# Patient Record
Sex: MALE | Race: OTHER | Employment: PART TIME | URBAN - METROPOLITAN AREA
[De-identification: names, ages, dates, MRNs, and addresses within clinical notes are randomized per-mention and may not be internally consistent; named-entity substitution may affect disease eponyms.]

---

## 2018-06-29 ENCOUNTER — APPOINTMENT (OUTPATIENT)
Dept: ULTRASOUND IMAGING | Age: 38
End: 2018-06-29
Attending: EMERGENCY MEDICINE
Payer: SELF-PAY

## 2018-06-29 ENCOUNTER — HOSPITAL ENCOUNTER (EMERGENCY)
Age: 38
Discharge: HOME | End: 2018-06-29
Attending: EMERGENCY MEDICINE
Payer: SELF-PAY

## 2018-06-29 VITALS
SYSTOLIC BLOOD PRESSURE: 190 MMHG | OXYGEN SATURATION: 100 % | HEART RATE: 96 BPM | DIASTOLIC BLOOD PRESSURE: 86 MMHG | TEMPERATURE: 97.5 F | RESPIRATION RATE: 18 BRPM

## 2018-06-29 DIAGNOSIS — K80.50 BILIARY COLIC: Primary | ICD-10-CM

## 2018-06-29 LAB
ALBUMIN SERPL-MCNC: 3.9 G/DL (ref 3.5–5)
ALBUMIN/GLOB SERPL: 0.9 {RATIO} (ref 1.1–2.2)
ALP SERPL-CCNC: 81 U/L (ref 45–117)
ALT SERPL-CCNC: 37 U/L (ref 12–78)
ANION GAP SERPL CALC-SCNC: 9 MMOL/L (ref 5–15)
APPEARANCE UR: CLEAR
AST SERPL-CCNC: 19 U/L (ref 15–37)
BACTERIA URNS QL MICRO: NEGATIVE /HPF
BASOPHILS # BLD: 0 K/UL (ref 0–0.1)
BASOPHILS NFR BLD: 0 % (ref 0–1)
BILIRUB SERPL-MCNC: 0.5 MG/DL (ref 0.2–1)
BILIRUB UR QL: NEGATIVE
BUN SERPL-MCNC: 10 MG/DL (ref 6–20)
BUN/CREAT SERPL: 10 (ref 12–20)
CALCIUM SERPL-MCNC: 8.9 MG/DL (ref 8.5–10.1)
CHLORIDE SERPL-SCNC: 102 MMOL/L (ref 97–108)
CO2 SERPL-SCNC: 29 MMOL/L (ref 21–32)
COLOR UR: NORMAL
CREAT SERPL-MCNC: 0.97 MG/DL (ref 0.7–1.3)
DIFFERENTIAL METHOD BLD: ABNORMAL
EOSINOPHIL # BLD: 0 K/UL (ref 0–0.4)
EOSINOPHIL NFR BLD: 0 % (ref 0–7)
EPITH CASTS URNS QL MICRO: NORMAL /LPF
ERYTHROCYTE [DISTWIDTH] IN BLOOD BY AUTOMATED COUNT: 12.3 % (ref 11.5–14.5)
GLOBULIN SER CALC-MCNC: 4.4 G/DL (ref 2–4)
GLUCOSE SERPL-MCNC: 125 MG/DL (ref 65–100)
GLUCOSE UR STRIP.AUTO-MCNC: NEGATIVE MG/DL
HCT VFR BLD AUTO: 41.3 % (ref 36.6–50.3)
HGB BLD-MCNC: 14.1 G/DL (ref 12.1–17)
HGB UR QL STRIP: NEGATIVE
HYALINE CASTS URNS QL MICRO: NORMAL /LPF (ref 0–5)
IMM GRANULOCYTES # BLD: 0 K/UL (ref 0–0.04)
IMM GRANULOCYTES NFR BLD AUTO: 0 % (ref 0–0.5)
KETONES UR QL STRIP.AUTO: NEGATIVE MG/DL
LEUKOCYTE ESTERASE UR QL STRIP.AUTO: NEGATIVE
LYMPHOCYTES # BLD: 0.9 K/UL (ref 0.8–3.5)
LYMPHOCYTES NFR BLD: 6 % (ref 12–49)
MCH RBC QN AUTO: 30.7 PG (ref 26–34)
MCHC RBC AUTO-ENTMCNC: 34.1 G/DL (ref 30–36.5)
MCV RBC AUTO: 89.8 FL (ref 80–99)
MONOCYTES # BLD: 0.6 K/UL (ref 0–1)
MONOCYTES NFR BLD: 4 % (ref 5–13)
NEUTS SEG # BLD: 13.1 K/UL (ref 1.8–8)
NEUTS SEG NFR BLD: 90 % (ref 32–75)
NITRITE UR QL STRIP.AUTO: NEGATIVE
NRBC # BLD: 0 K/UL (ref 0–0.01)
NRBC BLD-RTO: 0 PER 100 WBC
PH UR STRIP: 6.5 [PH] (ref 5–8)
PLATELET # BLD AUTO: 335 K/UL (ref 150–400)
PMV BLD AUTO: 9.1 FL (ref 8.9–12.9)
POTASSIUM SERPL-SCNC: 3.3 MMOL/L (ref 3.5–5.1)
PROT SERPL-MCNC: 8.3 G/DL (ref 6.4–8.2)
PROT UR STRIP-MCNC: NEGATIVE MG/DL
RBC # BLD AUTO: 4.6 M/UL (ref 4.1–5.7)
RBC #/AREA URNS HPF: NORMAL /HPF (ref 0–5)
RBC MORPH BLD: ABNORMAL
SODIUM SERPL-SCNC: 140 MMOL/L (ref 136–145)
SP GR UR REFRACTOMETRY: 1.02 (ref 1–1.03)
UA: UC IF INDICATED,UAUC: NORMAL
UROBILINOGEN UR QL STRIP.AUTO: 0.2 EU/DL (ref 0.2–1)
WBC # BLD AUTO: 14.6 K/UL (ref 4.1–11.1)
WBC URNS QL MICRO: NORMAL /HPF (ref 0–4)

## 2018-06-29 PROCEDURE — 36415 COLL VENOUS BLD VENIPUNCTURE: CPT | Performed by: EMERGENCY MEDICINE

## 2018-06-29 PROCEDURE — 81001 URINALYSIS AUTO W/SCOPE: CPT | Performed by: EMERGENCY MEDICINE

## 2018-06-29 PROCEDURE — 76705 ECHO EXAM OF ABDOMEN: CPT

## 2018-06-29 PROCEDURE — 80053 COMPREHEN METABOLIC PANEL: CPT | Performed by: EMERGENCY MEDICINE

## 2018-06-29 PROCEDURE — 74011250637 HC RX REV CODE- 250/637: Performed by: EMERGENCY MEDICINE

## 2018-06-29 PROCEDURE — 99283 EMERGENCY DEPT VISIT LOW MDM: CPT

## 2018-06-29 PROCEDURE — 85025 COMPLETE CBC W/AUTO DIFF WBC: CPT | Performed by: EMERGENCY MEDICINE

## 2018-06-29 RX ORDER — ONDANSETRON 4 MG/1
4 TABLET, ORALLY DISINTEGRATING ORAL
Qty: 12 TAB | Refills: 0 | Status: SHIPPED | OUTPATIENT
Start: 2018-06-29

## 2018-06-29 RX ORDER — ONDANSETRON 4 MG/1
8 TABLET, ORALLY DISINTEGRATING ORAL
Status: COMPLETED | OUTPATIENT
Start: 2018-06-29 | End: 2018-06-29

## 2018-06-29 RX ORDER — IBUPROFEN 200 MG
TABLET ORAL
COMMUNITY

## 2018-06-29 RX ADMIN — ONDANSETRON 8 MG: 4 TABLET, ORALLY DISINTEGRATING ORAL at 12:26

## 2018-06-29 NOTE — ED TRIAGE NOTES
Pt c/o sharp pain in LUQ of abd that started yesterday AM. Pt vomited yesterday 5 times. Last vomited 1 hour ago.

## 2018-06-29 NOTE — ED PROVIDER NOTES
HPI Comments: 30 y.o. male with no significant past medical history who presents ambulatory from home accompanied by family members with chief complaint of abdominal pain. Patient arrives c/o acute onset of \"sharp\" RUQ abdominal pain starting at approx. 2100 last night, exacerbated with PO intake. Patient reports 6 subsequent vomiting episodes following onset of symptoms. Last episode 1 hour ago. Patient notes he has had similar symptoms in the past but has not been evaluated for them. Patient denies diarrhea, fevers, and sick contacts. He has not found anything that improves his symptoms. There are no other acute medical concerns at this time. PCP: No primary care provider on file. Note written by Khloe Oreilly, as dictated by Maria De Jesus Thompson MD 11:21 AM    The history is provided by the patient and a relative. No  was used. No past medical history on file. No past surgical history on file. No family history on file. Social History     Social History    Marital status: N/A     Spouse name: N/A    Number of children: N/A    Years of education: N/A     Occupational History    Not on file. Social History Main Topics    Smoking status: Not on file    Smokeless tobacco: Not on file    Alcohol use Not on file    Drug use: Not on file    Sexual activity: Not on file     Other Topics Concern    Not on file     Social History Narrative         ALLERGIES: Review of patient's allergies indicates no known allergies. Review of Systems   Constitutional: Negative for chills and fever. Respiratory: Negative for shortness of breath. Cardiovascular: Negative for chest pain. Gastrointestinal: Positive for abdominal pain (\"sharp\", RUQ) and vomiting (6 episodes). Negative for constipation and diarrhea. Neurological: Negative for dizziness and light-headedness. All other systems reviewed and are negative.       Vitals:    06/29/18 1117   BP: 190/86 Pulse: 96   Resp: 18   Temp: 97.5 °F (36.4 °C)   SpO2: 100%            Physical Exam   Constitutional: He is oriented to person, place, and time. He appears well-developed. No distress. HENT:   Head: Normocephalic and atraumatic. Eyes: Pupils are equal, round, and reactive to light. No scleral icterus. Neck: Normal range of motion. Neck supple. Cardiovascular: Normal rate and regular rhythm. Pulmonary/Chest: Effort normal and breath sounds normal.   Abdominal: Soft. He exhibits no distension. There is no tenderness. There is no rebound, no guarding and negative Torres's sign. Musculoskeletal: Normal range of motion. Neurological: He is alert and oriented to person, place, and time. Skin: Skin is warm and dry. He is not diaphoretic. Psychiatric: He has a normal mood and affect. His behavior is normal. Thought content normal.   Nursing note and vitals reviewed. Note written by Khloe Marcus, as dictated by Hector Ernandez MD 11:21 AM    MDM  Number of Diagnoses or Management Options  Biliary colic: established and worsening  Diagnosis management comments: The patient is resting comfortably and feels better, is alert and in no distress. The repeat examination is unremarkable and benign; in particular, there is no discomfort at McBurney's point. The history, exam, diagnostic testing, and current condition do not suggest acute appendicitis, bowel obstruction, incarcerated hernia, acute cholecystitis, bowel perforation, major gastrointestinal bleeding, severe diverticulitis, sepsis, or other significant pathology to warrant further testing, continued ED treatment, admission, or surgical evaluation at this point. The vital signs have been stable and are within normal limits at this time. The patient does not have uncontrollable pain, intractable vomiting, or other significant symptoms. The patient's condition is stable and appropriate for discharge.  The patient will pursue further outpatient evaluation with the general surgeon for definitive management of his gallstones as indicated in the discharge instructions. ED Course       Procedures    1:41 PM  Patient's results have been reviewed with them. Patient and/or family have verbally conveyed their understanding and agreement of the patient's signs, symptoms, diagnosis, treatment and prognosis and additionally agree to follow up as recommended or return to the Emergency Room should their condition change prior to follow-up. Discharge instructions have also been provided to the patient with some educational information regarding their diagnosis as well a list of reasons why they would want to return to the ER prior to their follow-up appointment should their condition change.

## 2018-06-29 NOTE — ED NOTES
Patient has been transferred out of private room to vertical flow waiting. Has been medicated with Zofran and care handed over to Preston Chairez RN after SBAR report.

## 2019-09-24 ENCOUNTER — HOSPITAL ENCOUNTER (OUTPATIENT)
Dept: LAB | Age: 39
Discharge: HOME | End: 2019-09-24

## 2019-09-24 ENCOUNTER — OFFICE VISIT (OUTPATIENT)
Dept: FAMILY MEDICINE CLINIC | Age: 39
End: 2019-09-24

## 2019-09-24 VITALS
HEIGHT: 66 IN | SYSTOLIC BLOOD PRESSURE: 154 MMHG | TEMPERATURE: 98.3 F | OXYGEN SATURATION: 98 % | WEIGHT: 315 LBS | BODY MASS INDEX: 50.62 KG/M2 | HEART RATE: 98 BPM | DIASTOLIC BLOOD PRESSURE: 89 MMHG

## 2019-09-24 DIAGNOSIS — E66.01 OBESITY, MORBID (HCC): ICD-10-CM

## 2019-09-24 DIAGNOSIS — S39.012A BACK STRAIN, INITIAL ENCOUNTER: Primary | ICD-10-CM

## 2019-09-24 DIAGNOSIS — F43.29 STRESS AND ADJUSTMENT REACTION: ICD-10-CM

## 2019-09-24 DIAGNOSIS — F50.89 COMPULSIVE OVEREATER: ICD-10-CM

## 2019-09-24 LAB
ALBUMIN SERPL-MCNC: 4 G/DL (ref 3.5–5)
ALBUMIN/GLOB SERPL: 1.1 {RATIO} (ref 1.1–2.2)
ALP SERPL-CCNC: 92 U/L (ref 45–117)
ALT SERPL-CCNC: 46 U/L (ref 12–78)
ANION GAP SERPL CALC-SCNC: 8 MMOL/L (ref 5–15)
AST SERPL-CCNC: 18 U/L (ref 15–37)
BILIRUB SERPL-MCNC: 0.3 MG/DL (ref 0.2–1)
BUN SERPL-MCNC: 16 MG/DL (ref 6–20)
BUN/CREAT SERPL: 18 (ref 12–20)
CALCIUM SERPL-MCNC: 9.4 MG/DL (ref 8.5–10.1)
CHLORIDE SERPL-SCNC: 106 MMOL/L (ref 97–108)
CO2 SERPL-SCNC: 27 MMOL/L (ref 21–32)
CREAT SERPL-MCNC: 0.91 MG/DL (ref 0.7–1.3)
EST. AVERAGE GLUCOSE BLD GHB EST-MCNC: 131 MG/DL
GLOBULIN SER CALC-MCNC: 3.8 G/DL (ref 2–4)
GLUCOSE SERPL-MCNC: 93 MG/DL (ref 65–100)
HBA1C MFR BLD: 6.2 % (ref 4.2–6.3)
POTASSIUM SERPL-SCNC: 3.9 MMOL/L (ref 3.5–5.1)
PROT SERPL-MCNC: 7.8 G/DL (ref 6.4–8.2)
SODIUM SERPL-SCNC: 141 MMOL/L (ref 136–145)
T4 FREE SERPL-MCNC: 0.9 NG/DL (ref 0.8–1.5)
TSH SERPL DL<=0.05 MIU/L-ACNC: 2.05 UIU/ML (ref 0.36–3.74)

## 2019-09-24 PROCEDURE — 84443 ASSAY THYROID STIM HORMONE: CPT

## 2019-09-24 PROCEDURE — 83036 HEMOGLOBIN GLYCOSYLATED A1C: CPT

## 2019-09-24 PROCEDURE — 84439 ASSAY OF FREE THYROXINE: CPT

## 2019-09-24 PROCEDURE — 80053 COMPREHEN METABOLIC PANEL: CPT

## 2019-09-24 RX ORDER — METHOCARBAMOL 750 MG/1
750 TABLET, FILM COATED ORAL 3 TIMES DAILY
Qty: 15 TAB | Refills: 1 | Status: SHIPPED | OUTPATIENT
Start: 2019-09-24

## 2019-09-24 RX ORDER — NAPROXEN 500 MG/1
500 TABLET ORAL 2 TIMES DAILY WITH MEALS
Qty: 60 TAB | Refills: 1 | Status: SHIPPED | OUTPATIENT
Start: 2019-09-24

## 2019-09-24 NOTE — PROGRESS NOTES
At discharge station AVS was printed and reviewed with pt with [employer]  # [id number]. Pt given [facility]  today for medication. Pt taken to registration to make return appointments as directed by provider today. Anna Escalona RN    .

## 2019-09-24 NOTE — PATIENT INSTRUCTIONS
Trastorno de adaptación: Instrucciones de cuidado - [ Adjustment Disorder: Care Instructions ]  Instrucciones de cuidado    Sufrir un trastorno de adaptación significa que usted tiene problemas emocionales o de comportamiento a causa del estrés. Tommie pedro respuesta al estrés es mucho más grave que danny respuesta normal. Es lo suficientemente intensa bebe para afectar pedro chay laboral o social y podría causar depresión así bebe lewis y problemas físicos. Entre los eventos que podrían causar esta respuesta están el divorcio, los problemas económicos o un comienzo escolar o laboral. Podría ser cualquier cosa que cause algo de estrés. Con mayor frecuencia, jose trastorno es un problema a corto plazo. Ocurre dentro de los 3 meses a partir del evento o el cambio estresante. Si la respuesta dura más de 6 meses después de que finaliza el [place], es posible que tenga un trastorno más grave. La atención de seguimiento es danny parte clave de pedro tratamiento y seguridad. Asegúrese de hacer y acudir a todas las citas, y llame a pedro médico si está teniendo problemas. También es danny buena idea saber los resultados de lisbeth exámenes y mantener danny lista de los medicamentos que valeria. ¿Cómo puede cuidarse en el hogar? · Asista a todas las sesiones de asesoría psicológica. No falte a ninguna porque se siente mejor. · Si pedro médico le receta algún medicamento, tómelo según las indicaciones. Llame a pedro médico si luz maria estar teniendo problemas con pedro medicamento. Recibirá [employer] medicamentos específicos recetados por pedro médico.  · Hable sobre las causas del estrés con un buen amigo o un familiar. O puede unirse a un patricia de apoyo para personas con problemas similares. Hablar con otras personas a veces mary el estrés. · Jesus por lo menos 30 minutos de ejercicio la mayoría de los días de la [place]. Caminar es danny buena opción.  Es posible que también quiera hacer otras actividades, bebe correr, nadar, [employer] en bicicleta, o jugar al tenis u otros deportes de equipo. Técnicas de relajación  Jesus ejercicios de relajación entre 10 y 21 minutos al día. Si lo desea, mientras los hace puede escuchar música tranquila y relajante. · Dígales a las demás personas de pedro hogar que va a hacer ejercicios de relajación. Pídales que no lo interrumpan. · Encuentre un lugar cómodo y tranquilo. · Acuéstese boca arriba o siéntese con la espalda derecha. · Concéntrese en pedro respiración. Respire de [place] lenta y spencer. · Inhale por la [place]. Exhale por la nariz o la boca. · Respire profundamente, llenando la xochitl entre el ombligo y la caja torácica. Respire de forma andrei que el abdomen se mueva hacia arriba y [place]. · No contenga la respiración. · Respire de [employer] 5 y 10 minutos. Perciba la sensación de serenidad en todo el cuerpo. Mientras continúa con la respiración lenta y profunda, relájese siguiendo estos pasos bronwyn otros 5 a 10 minutos:  · Tensione y relaje cada patricia de músculos de pedro cuerpo. Comience con los dedos de los pies y continúe hacia arriba hasta llegar a la amanda. · Imagine a los grupos de músculos relajándose y volviéndose pesados. · Ponga la mente totalmente en jones.  · Permítase relajarse más y en forma más profunda. · Sea consciente del estado de serenidad que lo rodea. · Cuando termine el tiempo de [place], para recobrar el estado de [place], [place] los dedos de las meli y los pies. Heriberto Sida y los pies. Y después, mueva todo el cuerpo. A veces, las personas se duermen bronwyn la relajación. Tommie con frecuencia se despiertan pronto. · Siempre tómese tiempo para regresar completamente a la consciencia antes de conducir un vehículo. Espere antes de hacer cualquier cosa que pudiera causar un accidente si no está completamente alerta. Nunca escuche música de relajación mientras conduzca un automóvil. ¿Cuándo debe pedir ayuda?   Llame al 911 en cualquier momento que considere que necesita atención de [place]. Por ejemplo, llame si:    · Nora que no puede evitar lastimarse o herir a otra persona. Tenga a mano los números de estas líneas telefónicas nacionales para la prevención del suicidio: [phone] ([phone]) y [phone] ([phone]). Si usted o alguien que usted conoce habla sobre el suicidio o acerca de sentirse desesperado, obtenga ayuda inmediatamente.    Preste especial atención a los cambios en pedro salvador y asegúrese de comunicarse con pedro médico si:    · Tiene nueva ansiedad o la ansiedad empeora.     · Se ha estado sintiendo kendy, deprimido o desesperanzado o ha perdido el interés en cosas que generalmente disfruta.     · No mejora bebe se esperaba. ¿Dónde puede encontrar más información en inglés? Ashley Gunderson a http://soren-luda.info/. Marcial To R087 en la búsqueda para aprender más acerca de \"Trastorno de adaptación: Instrucciones de cuidado - [ Adjustment Disorder: Care Instructions ]. \"  Revisado: 7 abril, 2019  Versión del contenido: 12.2  © 8816-7605 Healthwise, Incorporated. Las instrucciones de cuidado fueron adaptadas bajo licencia por Good Help Connections (which disclaims liability or warranty for this information). Si usted tiene [place] afección médica o sobre estas instrucciones, siempre pregunte a pedro profesional de salvador. Healthwise, Incorporated niega toda garantía o responsabilidad por pedro uso de esta información. Distensión de la espalda: Instrucciones de cuidado - [ Back Strain: Care Instructions ]  Generalidades    Jenni distensión en la espalda ocurre cuando usted estira demasiado, o fuerza, un músculo de la espalda. Usted puede lesionarse la espalda en un accidente o cuando hace ejercicio o levanta algo. En ocasiones, es posible que no sepa cómo se lesionó la espalda. [place] de los lewis de espalda mejoran con tiempo y reposo.  Usted puede cuidarse en el hogar para ayudar a que pedro espalda sane.  Álvaro Morgan de seguimiento es danny parte clave de pedro tratamiento y seguridad. Asegúrese de hacer y acudir a todas las citas, y llame a pedro médico si está teniendo problemas. También es danny buena idea saber los resultados de lisbeth exámenes y mantener danny lista de los medicamentos que valeria. ¿Cómo puede cuidarse en el hogar? · Trate de permanecer tan activo bebe pueda, marizol deje de hacer o reduzca cualquier actividad que le produzca dolor. · Colóquese hielo o danny compresa fría en el músculo adolorido de 10 a 20 minutos cada vez para detener la hinchazón. Trate de hacerlo cada 1 o 2 horas bronwyn 3 días (cuando esté despierto) o hasta que la hinchazón baje. Póngase un paño adrian entre el hielo y la piel. · Después de 2 o 3 días, coloque danny almohadilla térmica ajustada a baja temperatura o un paño tibio sobre la espalda. Algunos médicos sugieren que se alterne entre tratamientos calientes y fríos. · [employer] analgésicos (medicamentos para el dolor) exactamente según las indicaciones. ? Si el médico le recetó un analgésico, tómelo según las indicaciones. ? Si no está tomando un analgésico recetado, pregúntele a pedro médico si puede radha sherif de [employer]. · Trate de dormir de lado con danny almohada entre las piernas. O, colóquese danny almohada debajo de las rodillas cuando se acueste [place] Estas medidas pueden aliviar el dolor en la parte baja de la espalda. · Storm Matthews a poco a pedro nivel habitual de actividades. ¿Cuándo debe pedir ayuda? Llame al 911 en cualquier momento que considere que necesita atención de [place]. Por ejemplo, llame si:    · Es absolutamente incapaz de  danny pierna.    Llame a pedro médico ahora mismo o busque atención médica inmediata si:    · Tiene síntomas nuevos o peores en las piernas, el abdomen o las nalgas. Los síntomas pueden incluir:  ? Entumecimiento u hormigueo. ? Debilidad.   ? Dolor.     · Pierde el control de la vejiga o los intestinos.    Preste especial atención a los [employer] salvador y asegúrese de comunicarse con pedro médico si:    · Tiene fiebre, pierde peso o no se siente vesna.     · No mejora bebe se esperaba. ¿Dónde puede encontrar más información en inglés? Quiana Kin a http://soren-luda.info/. Judy Wagoner D501 en la búsqueda para aprender más acerca de \"Distensión de la espalda: Instrucciones de cuidado - [ Back Strain: Care Instructions ]. \"  Revisado: 26 junio, 2019  Versión del contenido: 12.2  © 8557-5664 [employer]. Las instrucciones de cuidado fueron adaptadas bajo licencia por Good Help Connections (which disclaims liability or warranty for this information). Si usted tiene [place] afección médica o sobre estas instrucciones, siempre pregunte a pedro profesional de salvador. [employer] niega toda garantía o responsabilidad por pedro uso de esta información.

## 2019-09-25 NOTE — PROGRESS NOTES
Assessment/Plan:    Diagnoses and all orders for this visit:    1. Back strain, initial encounter  -     naproxen (NAPROSYN) 500 mg tablet; Take 1 Tab by mouth two (2) times daily (with meals). -     methocarbamol (ROBAXIN) 750 mg tablet; Take 1 Tab by mouth three (3) times daily. 2. Obesity, morbid ([address]  -     REFERRAL TO NUTRITION  -     HEMOGLOBIN A1C WITH EAG; Future  -     TSH 3RD GENERATION; Future  -     METABOLIC PANEL, COMPREHENSIVE; Future  -     T4, FREE; Future    3. Compulsive overeater  -     REFERRAL TO NUTRITION  -     REFERRAL TO BEHAVIORAL HEALTH    4. Stress and adjustment reaction  -     REFERRAL TO BEHAVIORAL HEALTH        Follow-up and Dispositions    · Return if symptoms worsen or fail to improve. SUJATA William expressed understanding of this plan. An AVS was printed and given to the patient.      ----------------------------------------------------------------------    Chief Complaint   Patient presents with    Back Pain     c/o pain on right mid back, unable to work due to the pain    Shoulder Pain     right side       History of Present Illness:  New pt. Here for 3 days of right middle back pain no radiation. No saddle anesthesia, no bowel or bladder fx changes. He does not know of an injury that started the problem. He has been out of work for 3 days due to the pain. He has no known medical problems. He is not on any medication currently   We discussed his weight. He states that he has gained 120 pounds in 3 years. He eats out of boredom and stress. He states that he only eats once daily, and we discussed how this too can cause weight gain      No past medical history on file. Current Outpatient Medications   Medication Sig Dispense Refill    naproxen (NAPROSYN) 500 mg tablet Take 1 Tab by mouth two (2) times daily (with meals). 60 Tab 1    methocarbamol (ROBAXIN) 750 mg tablet Take 1 Tab by mouth three (3) times daily.  15 Tab 1 Allergies no known allergies    Social History     Tobacco Use    Smoking status: Former Smoker     Types: Cigarettes     Last attempt to quit: 2017     Years since quittin.0    Smokeless tobacco: Never Used   Substance Use Topics    Alcohol use: Never     Frequency: Never    Drug use: Never       No family history on file. Physical Exam:     Visit Vitals  /89 (BP 1 Location: Right arm, BP Patient Position: Sitting)   Pulse 98   Temp 98.3 °F (36.8 °C) (Oral)   Ht 5' 5.98\" (1.676 m)   Wt (!) 366 lb 6.4 oz (166.2 kg)   SpO2 98%   BMI 59.17 kg/m²       A&Ox3  WDWN NAD  Respirations normal and non labored  MSK exam- toe touch not possible due to right mid flank pain.  Pain right lateral mid back with palpation  Gait normal. Neuro down to toes intact

## 2019-09-25 NOTE — PROGRESS NOTES
Please inform the pt: thyroid is normal. You have pre-diabetes and lifestyle changes are needed. Please keep appt with dietician as scheduled.  Thank you

## 2019-10-31 ENCOUNTER — DOCUMENTATION ONLY (OUTPATIENT)
Dept: FAMILY MEDICINE CLINIC | Age: 39
End: 2019-10-31

## 2019-10-31 NOTE — PROGRESS NOTES
No show for initial appointment. Provider has asked nutritionist to follow up at their initial meeting in November.

## 2022-10-19 ENCOUNTER — APPOINTMENT (OUTPATIENT)
Dept: GENERAL RADIOLOGY | Age: 42
End: 2022-10-19
Attending: STUDENT IN AN ORGANIZED HEALTH CARE EDUCATION/TRAINING PROGRAM

## 2022-10-19 ENCOUNTER — APPOINTMENT (OUTPATIENT)
Dept: CT IMAGING | Age: 42
End: 2022-10-19
Attending: STUDENT IN AN ORGANIZED HEALTH CARE EDUCATION/TRAINING PROGRAM

## 2022-10-19 ENCOUNTER — HOSPITAL ENCOUNTER (EMERGENCY)
Age: 42
Discharge: HOME | End: 2022-10-19
Attending: EMERGENCY MEDICINE

## 2022-10-19 VITALS
HEART RATE: 101 BPM | SYSTOLIC BLOOD PRESSURE: 170 MMHG | WEIGHT: 315 LBS | OXYGEN SATURATION: 97 % | BODY MASS INDEX: 52.48 KG/M2 | TEMPERATURE: 98.5 F | HEIGHT: 65 IN | RESPIRATION RATE: 18 BRPM | DIASTOLIC BLOOD PRESSURE: 99 MMHG

## 2022-10-19 DIAGNOSIS — M54.50 ACUTE LEFT-SIDED LOW BACK PAIN WITHOUT SCIATICA: Primary | ICD-10-CM

## 2022-10-19 LAB
ALBUMIN SERPL-MCNC: 3.4 G/DL (ref 3.5–5)
ALBUMIN/GLOB SERPL: 0.7 {RATIO} (ref 1.1–2.2)
ALP SERPL-CCNC: 100 U/L (ref 45–117)
ALT SERPL-CCNC: 109 U/L (ref 12–78)
ANION GAP SERPL CALC-SCNC: 7 MMOL/L (ref 5–15)
APPEARANCE UR: CLEAR
AST SERPL-CCNC: 73 U/L (ref 15–37)
BACTERIA URNS QL MICRO: NEGATIVE /HPF
BASOPHILS # BLD: 0 K/UL (ref 0–0.1)
BASOPHILS NFR BLD: 0 % (ref 0–1)
BILIRUB SERPL-MCNC: 0.7 MG/DL (ref 0.2–1)
BILIRUB UR QL: NEGATIVE
BUN SERPL-MCNC: 14 MG/DL (ref 6–20)
BUN/CREAT SERPL: 15 (ref 12–20)
CALCIUM SERPL-MCNC: 9.3 MG/DL (ref 8.5–10.1)
CHLORIDE SERPL-SCNC: 100 MMOL/L (ref 97–108)
CO2 SERPL-SCNC: 28 MMOL/L (ref 21–32)
COLOR UR: YELLOW
COMMENT, HOLDF: NORMAL
CREAT SERPL-MCNC: 0.96 MG/DL (ref 0.7–1.3)
DIFFERENTIAL METHOD BLD: NORMAL
EOSINOPHIL # BLD: 0.2 K/UL (ref 0–0.4)
EOSINOPHIL NFR BLD: 2 % (ref 0–7)
EPITH CASTS URNS QL MICRO: ABNORMAL /LPF
ERYTHROCYTE [DISTWIDTH] IN BLOOD BY AUTOMATED COUNT: 12 % (ref 11.5–14.5)
GLOBULIN SER CALC-MCNC: 4.6 G/DL (ref 2–4)
GLUCOSE SERPL-MCNC: 223 MG/DL (ref 65–100)
GLUCOSE UR STRIP.AUTO-MCNC: NEGATIVE MG/DL
HCT VFR BLD AUTO: 41.6 % (ref 36.6–50.3)
HGB BLD-MCNC: 14 G/DL (ref 12.1–17)
HGB UR QL STRIP: ABNORMAL
HYALINE CASTS URNS QL MICRO: ABNORMAL /LPF (ref 0–2)
IMM GRANULOCYTES # BLD AUTO: 0 K/UL (ref 0–0.04)
IMM GRANULOCYTES NFR BLD AUTO: 0 % (ref 0–0.5)
KETONES UR QL STRIP.AUTO: NEGATIVE MG/DL
LEUKOCYTE ESTERASE UR QL STRIP.AUTO: NEGATIVE
LYMPHOCYTES # BLD: 2.2 K/UL (ref 0.8–3.5)
LYMPHOCYTES NFR BLD: 25 % (ref 12–49)
MCH RBC QN AUTO: 30.4 PG (ref 26–34)
MCHC RBC AUTO-ENTMCNC: 33.7 G/DL (ref 30–36.5)
MCV RBC AUTO: 90.4 FL (ref 80–99)
MONOCYTES # BLD: 0.6 K/UL (ref 0–1)
MONOCYTES NFR BLD: 7 % (ref 5–13)
NEUTS SEG # BLD: 5.9 K/UL (ref 1.8–8)
NEUTS SEG NFR BLD: 66 % (ref 32–75)
NITRITE UR QL STRIP.AUTO: NEGATIVE
NRBC # BLD: 0 K/UL (ref 0–0.01)
NRBC BLD-RTO: 0 PER 100 WBC
PH UR STRIP: 6.5 [PH] (ref 5–8)
PLATELET # BLD AUTO: 308 K/UL (ref 150–400)
PMV BLD AUTO: 9.6 FL (ref 8.9–12.9)
POTASSIUM SERPL-SCNC: 3.6 MMOL/L (ref 3.5–5.1)
PROT SERPL-MCNC: 8 G/DL (ref 6.4–8.2)
PROT UR STRIP-MCNC: NEGATIVE MG/DL
RBC # BLD AUTO: 4.6 M/UL (ref 4.1–5.7)
RBC #/AREA URNS HPF: ABNORMAL /HPF (ref 0–5)
SAMPLES BEING HELD,HOLD: NORMAL
SODIUM SERPL-SCNC: 135 MMOL/L (ref 136–145)
SP GR UR REFRACTOMETRY: 1.01 (ref 1–1.03)
UA: UC IF INDICATED,UAUC: ABNORMAL
UROBILINOGEN UR QL STRIP.AUTO: 1 EU/DL (ref 0.2–1)
WBC # BLD AUTO: 9.1 K/UL (ref 4.1–11.1)
WBC URNS QL MICRO: ABNORMAL /HPF (ref 0–4)

## 2022-10-19 PROCEDURE — 71046 X-RAY EXAM CHEST 2 VIEWS: CPT

## 2022-10-19 PROCEDURE — 36415 COLL VENOUS BLD VENIPUNCTURE: CPT

## 2022-10-19 PROCEDURE — 93005 ELECTROCARDIOGRAM TRACING: CPT

## 2022-10-19 PROCEDURE — 74176 CT ABD & PELVIS W/O CONTRAST: CPT

## 2022-10-19 PROCEDURE — 85025 COMPLETE CBC W/AUTO DIFF WBC: CPT

## 2022-10-19 PROCEDURE — 99285 EMERGENCY DEPT VISIT HI MDM: CPT

## 2022-10-19 PROCEDURE — 81001 URINALYSIS AUTO W/SCOPE: CPT

## 2022-10-19 PROCEDURE — 80053 COMPREHEN METABOLIC PANEL: CPT

## 2022-10-19 RX ORDER — CYCLOBENZAPRINE HCL 10 MG
10 TABLET ORAL
Qty: 21 TABLET | Refills: 0 | Status: SHIPPED | OUTPATIENT
Start: 2022-10-19 | End: 2022-10-26

## 2022-10-19 NOTE — ED TRIAGE NOTES
Maltese interpretor [id number] used for triage. Patient reports \"right kidney pain and lung pain\". Reports kidney pain X1 week that has progressed to his lungs/back. Denies any pain or difficulty with urination.

## 2022-10-20 LAB
ATRIAL RATE: 102 BPM
CALCULATED P AXIS, ECG09: 34 DEGREES
CALCULATED R AXIS, ECG10: 43 DEGREES
CALCULATED T AXIS, ECG11: 67 DEGREES
DIAGNOSIS, 93000: NORMAL
P-R INTERVAL, ECG05: 158 MS
Q-T INTERVAL, ECG07: 358 MS
QRS DURATION, ECG06: 100 MS
QTC CALCULATION (BEZET), ECG08: 466 MS
VENTRICULAR RATE, ECG03: 102 BPM

## 2022-10-20 NOTE — DISCHARGE INSTRUCTIONS
[facility] SYSTEMS Departments     For adult and child immunizations, family planning, TB screening, STD testing and women's health services. [facility]: [place] [phone]      [facility] 25   [address]   [address]   170 [facility]: Luisa [place] [address] [phone]      [address]          [address] [place] [address]     For primary care services, woman and child wellness, and some clinics providing specialty care. [facility] -- [address] [facility] [phone]   411 [facility] 200 [facility] 3614 [facility] [phone]   [address] [facility] [address] [phone]   [address]  [facility] [address] [place] [address] 5850 [facility]  [phone]   [address] [facility] [address] [address] [place] [phone]   [place] [address] [facility] [phone]   [facility] [address] [place] [address] [phone]   [facility]: [facility] [address] [place] [address], [address] [phone]     [facility] [address] [facility] [address] [phone]   [facility] 58[address] [facility]  [phone]   [facility]  [address], [address] ([email or web address] asp) [phone]         Sexual Health/Woman Wellness Clinics    For STD/HIV testing and treatment, pregnancy testing and services, men's health, birth control services, LGBT services, and hepatitis/HPV vaccine services. [facility] for [place] [employer] [address] [facility] [address] [facility] 1579 [address] EJord[address] [phone]   [facility] [address], 5th floor [phone]   Pregnancy [address] for Women [facility] NICHOLE Márquez [phone]         Specialty Service [address]    [phone]   [place]   [phone]   Women, Infant and Children's Services: [address] [phone]       600 [facility]   [phone]   [address]   [facility]     [phone]   [address] Crisis   [address] [phone]       Local Primary Care Physicians  [facility] [phone]  Eusebio Peace, MD Meryle Bing, MD Orest Mano, MD [facility] [phone]  Ubaldo Waddell, [facility]  Romain Vásquez, MD Sky Estevez MD [address] [address] [phone]  MD Chang Coronado MD [address] [facility] [phone]  Hernandez Bonds, MD Korina Espinoza, MD Leyda Green, MD Janel Connell MD   [facility] [phone]  [facility] LAURITA CARVAJAL, MD Brennen Bedoya, MD Hector Gaitan, NP [address] [phone]  Flaquito Gabriel, MD Annabella Abraham, MD Tennille Rod, MD Ani Mejia, MD Laymon Heimlich, MD Vertell Peck, MD   [address] 57 [facility]  Tino Birmingham MD [address] [phone]  MD Rosette Otero, NP  Rahel Donis, MD Cipriano Ordoñez MD Demetria Lutes, MD Juda Carolus, MD   74[address] [facility] [phone]  MD Cecy Roblero, [facility]  Eldon Davis, NP  MD Rosas Lewis MD Towana Rundle, MD Daleen Bellow, MD [facility] [phone]  MD Guerline Patiño MD Carloyn Destine, MD Skip Ishikawa, MD Rayetta Batman, MD   [address] [phone]  MD Peri [place] MD [place] [phone]  MD Alphonso Peterson MD Lexine [facility] Deya Kumar, [address] [facility] [phone]  Pablito Florentino, MD Pratik Rahman, MD Sohan Wheeler, MD Carie Chavez, MD Mark Zapata, MD Yanique Quintanilla, NICK Baumann MD 1619 [facility]   [phone]  Ismael Chick, MD Wyvonne Mortimer, MD Jesus Guzman MD   2102 [facility] [phone]  Sihra Rueda, MD Victor Hugo Clayton, P  Shanta Sluder, PA-C  Shanta Sluder, FNP  Camilo Sports, PA-C  Akilah Holt, MD Angi Sarmiento, NICK Wei, DO Miscellaneous:  Ewelina Goldsmith [phone]

## 2022-10-20 NOTE — ED PROVIDER NOTES
81-year-old male with no significant past medical history presents to ED with 1 week of right flank pain. Patient reports that for the past week he has had a dull, achy pain in his right lower back that he reports \"feels like it is in my kidney. \". He reports he has no history of similar symptoms like this before but denies any associated dysuria, urinary frequency, hematuria, nausea, vomiting, diarrhea, fevers, chills. Patient also reports that \"my lungs have hurt me my whole life\". When asked to elaborate on this patient was unable to. He denies any chest pain, shortness of breath or dyspnea on exertion. He reports that this is not worsened with inspiration. He reports \"it just feels like my lungs hurt me\". Patient reports that he has felt this way \"his whole life\" but has never talked to a medical provider about this before. The history is provided by the patient. A  was used. Back Pain   Pertinent negatives include no chest pain, no fever, no headaches and no dysuria. No past medical history on file. No past surgical history on file. No family history on file.     Social History     Socioeconomic History    Marital status:      Spouse name: Not on file    Number of children: Not on file    Years of education: Not on file    Highest education level: Not on file   Occupational History    Not on file   Tobacco Use    Smoking status: Former     Types: Cigarettes     Quit date: 2017     Years since quittin.0    Smokeless tobacco: Never   Substance and Sexual Activity    Alcohol use: Never    Drug use: Never    Sexual activity: Not on file   Other Topics Concern    Not on file   Social History Narrative    ** Merged History Encounter **          Social Determinants of Health     Financial Resource Strain: Not on file   Food Insecurity: Not on file   Transportation Needs: Not on file   Physical Activity: Not on file   Stress: Not on file   Social Connections: Not on file   Intimate Partner Violence: Not on file   Housing Stability: Not on file         ALLERGIES: Patient has no known allergies. Review of Systems   Constitutional:  Negative for fever. HENT:  Negative for congestion and sinus pain. Respiratory:  Negative for cough. Cardiovascular:  Negative for chest pain. Gastrointestinal:  Negative for nausea and vomiting. Genitourinary:  Negative for dysuria. Musculoskeletal:  Positive for back pain. Negative for myalgias. Neurological:  Negative for headaches. Hematological:  Negative for adenopathy. All other systems reviewed and are negative. Vitals:    10/19/22 1819   BP: (!) 170/99   Pulse: (!) 101   Resp: 18   Temp: 98.5 °F (36.9 °C)   SpO2: 97%   Weight: (!) 170.1 kg (375 lb)   Height: 5' 5\" (1.651 m)            Physical Exam  Vitals and nursing note reviewed. Constitutional:       Appearance: Normal appearance. He is obese. Eyes:      Extraocular Movements: Extraocular movements intact. Pupils: Pupils are equal, round, and reactive to light. Cardiovascular:      Rate and Rhythm: Normal rate and regular rhythm. Heart sounds: Normal heart sounds. Pulmonary:      Effort: Pulmonary effort is normal.      Breath sounds: Normal breath sounds. Abdominal:      Palpations: Abdomen is soft. Tenderness: There is no abdominal tenderness. There is right CVA tenderness. Lymphadenopathy:      Cervical: No cervical adenopathy. Skin:     General: Skin is warm and dry. Neurological:      General: No focal deficit present. Mental Status: He is alert and oriented to person, place, and time. Psychiatric:         Mood and Affect: Mood normal.         Behavior: Behavior normal.        MDM  Number of Diagnoses or Management Options  Acute left-sided low back pain without sciatica  Diagnosis management comments: 42-year-old male with no significant past medical history presents to ED with 1 week of right flank pain.   Vital signs stable in triage patient is afebrile. Physical exam notable for right CVA tenderness but no abdominal tenderness to palpation. Labs unremarkable aside from a small amount of hematuria. Given flank pain and hematuria ordered CT of abdomen pelvis without contrast to rule out ureteral stone. She had nonobstructing bilateral nephrolithiasis but no ureteral stone. No other acute abnormalities. Chest x-ray showed low lung volumes with perihilar interstitial prominence and atelectasis but upon further evaluation with supervising physician determined that this was likely incidental in nature and not related to patient's symptoms. Patient will be discharged with instructions for conservative care, follow-up and return precautions.            Procedures